# Patient Record
Sex: FEMALE | Race: BLACK OR AFRICAN AMERICAN | NOT HISPANIC OR LATINO | URBAN - METROPOLITAN AREA
[De-identification: names, ages, dates, MRNs, and addresses within clinical notes are randomized per-mention and may not be internally consistent; named-entity substitution may affect disease eponyms.]

---

## 2024-03-21 ENCOUNTER — HOSPITAL ENCOUNTER (EMERGENCY)
Facility: HOSPITAL | Age: 19
Discharge: HOME | End: 2024-03-21
Attending: STUDENT IN AN ORGANIZED HEALTH CARE EDUCATION/TRAINING PROGRAM
Payer: COMMERCIAL

## 2024-03-21 VITALS
RESPIRATION RATE: 16 BRPM | OXYGEN SATURATION: 100 % | HEART RATE: 75 BPM | HEIGHT: 65 IN | DIASTOLIC BLOOD PRESSURE: 79 MMHG | WEIGHT: 117 LBS | SYSTOLIC BLOOD PRESSURE: 132 MMHG | BODY MASS INDEX: 19.49 KG/M2 | TEMPERATURE: 97.8 F

## 2024-03-21 DIAGNOSIS — R63.0 ANOREXIA: Primary | ICD-10-CM

## 2024-03-21 LAB — PREGNANCY TEST URINE, POC: NEGATIVE

## 2024-03-21 PROCEDURE — 99283 EMERGENCY DEPT VISIT LOW MDM: CPT

## 2024-03-21 PROCEDURE — 81025 URINE PREGNANCY TEST: CPT | Performed by: STUDENT IN AN ORGANIZED HEALTH CARE EDUCATION/TRAINING PROGRAM

## 2024-03-21 PROCEDURE — 99284 EMERGENCY DEPT VISIT MOD MDM: CPT | Performed by: STUDENT IN AN ORGANIZED HEALTH CARE EDUCATION/TRAINING PROGRAM

## 2024-03-21 RX ORDER — ONDANSETRON 4 MG/1
4 TABLET, ORALLY DISINTEGRATING ORAL EVERY 8 HOURS PRN
Qty: 20 TABLET | Refills: 0 | Status: SHIPPED | OUTPATIENT
Start: 2024-03-21 | End: 2024-03-28

## 2024-03-21 ASSESSMENT — LIFESTYLE VARIABLES
HAVE PEOPLE ANNOYED YOU BY CRITICIZING YOUR DRINKING: NO
TOTAL SCORE: 0
EVER FELT BAD OR GUILTY ABOUT YOUR DRINKING: NO
EVER HAD A DRINK FIRST THING IN THE MORNING TO STEADY YOUR NERVES TO GET RID OF A HANGOVER: NO
HAVE YOU EVER FELT YOU SHOULD CUT DOWN ON YOUR DRINKING: NO

## 2024-03-21 ASSESSMENT — PAIN SCALES - GENERAL: PAINLEVEL_OUTOF10: 0 - NO PAIN

## 2024-03-21 ASSESSMENT — PAIN - FUNCTIONAL ASSESSMENT: PAIN_FUNCTIONAL_ASSESSMENT: 0-10

## 2024-03-22 NOTE — ED PROVIDER NOTES
History of Present Illness   CC: Weight Loss     History provided by: Patient  Limitations to History: None    HPI:  Nelson Abbott is a 18 y.o. female who is otherwise healthy presenting to the emergency department with concern for potentially having a tapeworm.  She states that she has had decreased appetite, nausea without emesis for approximately 3 weeks now.  She states she has lost 11 pounds since January of this year.  She saw her PCP yesterday for the same complaint, and had lab work obtained.  She states that she saw that all of her lab work was normal, but is concerned about a tapeworm.  She denies any significant abdominal pain.  Reports that her last period was the end of this month.  States that she has regular periods.  Denies any hair loss.  Denies any heat or cold intolerance.  Does state she has a history of depression and is not compliant with amitriptyline.  She denies any history of vomiting or purging.  States she has not had much of an appetite, but has been trying to eat small amounts of soup, and easy foods.  She denies eating any raw meat or pork    External Records Reviewed: Reviewed PCP note from yesterday.  Repeat outpatient labs from yesterday including CBC, metabolic panel, LFTs, TSH, all within normal limits.    Physical Exam   Triage vitals:  T 36.6 °C (97.8 °F)  HR 75  /79  RR 16  O2 100 % None (Room air)    Vital signs reviewed in nursing triage note, EMR flow sheets, and at patient's bedside.   General: Awake, alert, in no acute distress  Eyes: Gaze conjugate.  No scleral icterus or injection  HENT: Normo-cephalic, atraumatic. No stridor.  Mucous membranes moist.  CV: Regular rate, Regular rhythm. Radial pulses 2+ bilaterally  Resp: Breathing non-labored, speaking in full sentences.  Clear to auscultation bilaterally  GI: Soft, non-distended, non-tender. No rebound or guarding.  MSK/Extremities: No gross bony deformities. Moving all extremities  Skin: Warm. Appropriate  color.  No stigmata of purging noted on the hands.  Neuro: Alert. Oriented. Face symmetric. Speech is fluent.  Gross strength and sensation intact in b/l UE and LEs  Psych: Appropriate mood and affect    ED Course & Medical Decision Making   ED Course:  ED Course as of 03/22/24 0042   Thu Mar 21, 2024   4120 POCT pregnancy, urine  Preg negative [SH]      ED Course User Index  [SH] Rene Aparicio MD         Diagnoses as of 03/22/24 0042   Anorexia       Differential diagnoses considered include but are not limited to: Anorexia, bulimia, anemia, hypothyroidism, electrolyte abnormality, intra-abdominal infection    Social Determinants Limiting Care: None identified    MDM:  18 y.o. female presenting to the emergency department with concern for having a tapeworm.  On arrival vital signs within normal limits.  Patient nontoxic-appearing, abdominal exam benign.  She is able to tolerate p.o. in the emergency department.  I reviewed her labs from yesterday, they are all within normal limits.  Do not feel that repeat laboratory evaluation today would be beneficial.  Pregnancy test was performed and was negative.  She states that she previously has taken Zofran ODT which has helped her nausea and allowed her to tolerate p.o., but recently her PCP switched her to normal tablets rather than dissolving once which she feels did not work as well.  She is requesting a refill of the ODT tablets.  She states she has not been taking her amitriptyline for depression.  This may be contributing.  Encouraged her to follow-up with her primary care as well as psychiatrist.  Encouraged her to inquire about potentially getting placed on antidepressant that also helps with appetite.  She voiced understanding.  We discussed return precautions and was discharged in stable condition    Disposition   As a result of the work-up, patient was discharged home.  They were informed of their diagnosis and instructed to come back with any concerns or  worsening of condition and was agreeable to the plan as discussed above.  The patient was given the opportunity to ask questions.  All of the patient's questions were answered.  The patient remained stable under my care.    Procedures   Procedures    Patient seen and discussed with ED attending physician.    Piotr Aparicio MD  PGY 3 Emergency Medicine         Rene Aparicio MD  Resident  03/22/24 0147

## 2024-03-22 NOTE — ED TRIAGE NOTES
Pt to the ED with concern of weight loss of 11 lbs and loss of appetite. Pt was checked if she was pregnant from a pcp and it was negative. Pt  believes she may have a tape worm.

## 2024-03-22 NOTE — DISCHARGE INSTRUCTIONS
Please do your best to eat small amounts of foods that you find tolerable.  Follow-up with your regular doctor as well as your psychiatrist.  There are medicines that can help with your appetite as well as her depression.  Return to the ED with any other concerning symptoms, fainting, any chest pain or shortness of breath.